# Patient Record
Sex: MALE | Race: BLACK OR AFRICAN AMERICAN | NOT HISPANIC OR LATINO | Employment: STUDENT | URBAN - METROPOLITAN AREA
[De-identification: names, ages, dates, MRNs, and addresses within clinical notes are randomized per-mention and may not be internally consistent; named-entity substitution may affect disease eponyms.]

---
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
## 2023-03-29 ENCOUNTER — HOSPITAL ENCOUNTER (EMERGENCY)
Facility: HOSPITAL | Age: 18
Discharge: HOME | End: 2023-03-29
Attending: EMERGENCY MEDICINE | Admitting: EMERGENCY MEDICINE
Payer: COMMERCIAL

## 2023-03-29 ENCOUNTER — APPOINTMENT (OUTPATIENT)
Dept: GENERAL RADIOLOGY | Facility: HOSPITAL | Age: 18
End: 2023-03-29
Payer: COMMERCIAL

## 2023-03-29 VITALS
SYSTOLIC BLOOD PRESSURE: 149 MMHG | WEIGHT: 160 LBS | RESPIRATION RATE: 16 BRPM | HEIGHT: 69 IN | HEART RATE: 97 BPM | TEMPERATURE: 98.3 F | OXYGEN SATURATION: 96 % | DIASTOLIC BLOOD PRESSURE: 95 MMHG | BODY MASS INDEX: 23.7 KG/M2

## 2023-03-29 DIAGNOSIS — R45.851 DEPRESSION WITH SUICIDAL IDEATION: Primary | ICD-10-CM

## 2023-03-29 DIAGNOSIS — R03.0 ELEVATED BLOOD PRESSURE READING: ICD-10-CM

## 2023-03-29 DIAGNOSIS — F32.A DEPRESSION WITH SUICIDAL IDEATION: Primary | ICD-10-CM

## 2023-03-29 LAB
ALBUMIN SERPL-MCNC: 4.9 G/DL (ref 3.5–5.2)
ALBUMIN/GLOB SERPL: 1.7 G/DL
ALP SERPL-CCNC: 90 U/L (ref 39–117)
ALT SERPL W P-5'-P-CCNC: 17 U/L (ref 1–41)
AMPHET+METHAMPHET UR QL: POSITIVE
ANION GAP SERPL CALCULATED.3IONS-SCNC: 11.7 MMOL/L (ref 5–15)
APAP SERPL-MCNC: <5 MCG/ML (ref 0–30)
AST SERPL-CCNC: 21 U/L (ref 1–40)
BARBITURATES UR QL SCN: NEGATIVE
BASOPHILS # BLD AUTO: 0.03 10*3/MM3 (ref 0–0.2)
BASOPHILS NFR BLD AUTO: 0.3 % (ref 0–1.5)
BENZODIAZ UR QL SCN: NEGATIVE
BILIRUB SERPL-MCNC: 0.3 MG/DL (ref 0–1.2)
BUN SERPL-MCNC: 11 MG/DL (ref 6–20)
BUN/CREAT SERPL: 11.8 (ref 7–25)
CALCIUM SPEC-SCNC: 9.8 MG/DL (ref 8.6–10.5)
CANNABINOIDS SERPL QL: NEGATIVE
CHLORIDE SERPL-SCNC: 101 MMOL/L (ref 98–107)
CO2 SERPL-SCNC: 26.3 MMOL/L (ref 22–29)
COCAINE UR QL: NEGATIVE
CREAT SERPL-MCNC: 0.93 MG/DL (ref 0.76–1.27)
DEPRECATED RDW RBC AUTO: 38.8 FL (ref 37–54)
EGFRCR SERPLBLD CKD-EPI 2021: 121.3 ML/MIN/1.73
EOSINOPHIL # BLD AUTO: 0.08 10*3/MM3 (ref 0–0.4)
EOSINOPHIL NFR BLD AUTO: 0.9 % (ref 0.3–6.2)
ERYTHROCYTE [DISTWIDTH] IN BLOOD BY AUTOMATED COUNT: 13.4 % (ref 12.3–15.4)
ETHANOL BLD-MCNC: <10 MG/DL (ref 0–10)
ETHANOL UR QL: <0.01 %
GLOBULIN UR ELPH-MCNC: 2.9 GM/DL
GLUCOSE SERPL-MCNC: 104 MG/DL (ref 65–99)
HCT VFR BLD AUTO: 48.6 % (ref 37.5–51)
HGB BLD-MCNC: 15.8 G/DL (ref 13–17.7)
IMM GRANULOCYTES # BLD AUTO: 0.03 10*3/MM3 (ref 0–0.05)
IMM GRANULOCYTES NFR BLD AUTO: 0.3 % (ref 0–0.5)
LYMPHOCYTES # BLD AUTO: 1.94 10*3/MM3 (ref 0.7–3.1)
LYMPHOCYTES NFR BLD AUTO: 22.4 % (ref 19.6–45.3)
MCH RBC QN AUTO: 26.6 PG (ref 26.6–33)
MCHC RBC AUTO-ENTMCNC: 32.5 G/DL (ref 31.5–35.7)
MCV RBC AUTO: 81.8 FL (ref 79–97)
METHADONE UR QL SCN: NEGATIVE
MONOCYTES # BLD AUTO: 0.81 10*3/MM3 (ref 0.1–0.9)
MONOCYTES NFR BLD AUTO: 9.3 % (ref 5–12)
NEUTROPHILS NFR BLD AUTO: 5.79 10*3/MM3 (ref 1.7–7)
NEUTROPHILS NFR BLD AUTO: 66.8 % (ref 42.7–76)
NRBC BLD AUTO-RTO: 0 /100 WBC (ref 0–0.2)
OPIATES UR QL: POSITIVE
OXYCODONE UR QL SCN: NEGATIVE
PLATELET # BLD AUTO: 334 10*3/MM3 (ref 140–450)
PMV BLD AUTO: 10.1 FL (ref 6–12)
POTASSIUM SERPL-SCNC: 3.5 MMOL/L (ref 3.5–5.2)
PROT SERPL-MCNC: 7.8 G/DL (ref 6–8.5)
QT INTERVAL: 347 MS
RBC # BLD AUTO: 5.94 10*6/MM3 (ref 4.14–5.8)
SALICYLATES SERPL-MCNC: <0.3 MG/DL
SODIUM SERPL-SCNC: 139 MMOL/L (ref 136–145)
WBC NRBC COR # BLD: 8.68 10*3/MM3 (ref 3.4–10.8)

## 2023-03-29 PROCEDURE — 80053 COMPREHEN METABOLIC PANEL: CPT | Performed by: PHYSICIAN ASSISTANT

## 2023-03-29 PROCEDURE — 80307 DRUG TEST PRSMV CHEM ANLYZR: CPT | Performed by: PHYSICIAN ASSISTANT

## 2023-03-29 PROCEDURE — 80143 DRUG ASSAY ACETAMINOPHEN: CPT | Performed by: PHYSICIAN ASSISTANT

## 2023-03-29 PROCEDURE — 90791 PSYCH DIAGNOSTIC EVALUATION: CPT

## 2023-03-29 PROCEDURE — 82077 ASSAY SPEC XCP UR&BREATH IA: CPT | Performed by: PHYSICIAN ASSISTANT

## 2023-03-29 PROCEDURE — 93010 ELECTROCARDIOGRAM REPORT: CPT | Performed by: INTERNAL MEDICINE

## 2023-03-29 PROCEDURE — 93005 ELECTROCARDIOGRAM TRACING: CPT | Performed by: PHYSICIAN ASSISTANT

## 2023-03-29 PROCEDURE — 85025 COMPLETE CBC W/AUTO DIFF WBC: CPT | Performed by: PHYSICIAN ASSISTANT

## 2023-03-29 PROCEDURE — 99284 EMERGENCY DEPT VISIT MOD MDM: CPT

## 2023-03-29 PROCEDURE — 80179 DRUG ASSAY SALICYLATE: CPT | Performed by: PHYSICIAN ASSISTANT

## 2023-03-29 PROCEDURE — 71045 X-RAY EXAM CHEST 1 VIEW: CPT

## 2023-03-29 RX ORDER — SODIUM CHLORIDE 0.9 % (FLUSH) 0.9 %
10 SYRINGE (ML) INJECTION AS NEEDED
Status: DISCONTINUED | OUTPATIENT
Start: 2023-03-29 | End: 2023-03-29 | Stop reason: HOSPADM

## 2023-03-29 RX ADMIN — SODIUM CHLORIDE 1000 ML: 9 INJECTION, SOLUTION INTRAVENOUS at 17:24

## 2023-03-29 NOTE — ED TRIAGE NOTES
.Pt masked on arrival, staff masked    Pt reports swallowed a tide pod about 40min ago in attempt to commit suicide, did vomit most of it back up, has tried to hurt self in the past; denies SI at this time

## 2023-03-29 NOTE — ED PROVIDER NOTES
EMERGENCY DEPARTMENT ENCOUNTER    Room Number:  07/07  Date of encounter:  3/30/2023  PCP: iMchelle Rabago APRN  Historian: Patient, family  Chronic or social conditions impacting care (social determinants of health): Nothing      I used full protective equipment while examining this patient.  This includes face mask, gloves and protective eyewear.  I washed my hands before entering the room and immediately upon leaving the room      HPI:  Chief Complaint: Ingestion, suicidal ideation  A complete HPI/ROS/PMH/PSH/SH/FH are unobtainable due to: Nothing    Context: Blake Flores is a 17 y.o. male who presents to the ED c/o acute on chronic depression, suicidal ideation and ingestion.  Patient states he was at home today when he swallowed a Tide laundry pod.  He reports he did this because he was lonely.  He has attempted suicide in the past.  Shortly after swallowing this he reports self induced vomiting which she reports he was able to get most of the time applied back up.  He did this approximately 1 hour ago.  He does report burning in his abdomen.  He denies any coingestions, alcohol ingestion.     Review of prior external notes (non-ED):   I reviewed internal medicine office visit from 3/9/2023.  Patient seen for routine physical exam.    Review of prior external test results outside of this encounter:  I reviewed the UDS from [id number].  This showed a positive amphetamine screen.    PAST MEDICAL HISTORY  Active Ambulatory Problems     Diagnosis Date Noted   • No Active Ambulatory Problems     Resolved Ambulatory Problems     Diagnosis Date Noted   • No Resolved Ambulatory Problems     No Additional Past Medical History         PAST SURGICAL HISTORY  No past surgical history on file.      FAMILY HISTORY  No family history on file.      SOCIAL HISTORY  Social History     Socioeconomic History   • Marital status: Single         ALLERGIES  Azithromycin and Cefdinir        REVIEW OF SYSTEMS  All systems  reviewed and negative except for those discussed in HPI.       PHYSICAL EXAM    I have reviewed the triage vital signs and nursing notes.    ED Triage Vitals   Temp Heart Rate Resp BP SpO2   03/29/23 1617 03/29/23 1617 03/29/23 1630 03/29/23 1630 03/29/23 1617   98.3 °F (36.8 °C) (!) 121 16 (!) 163/114 99 %      Temp src Heart Rate Source Patient Position BP Location FiO2 (%)   03/29/23 1617 03/29/23 1617 -- -- --   Tympanic Monitor          Physical Exam  GENERAL: Alert, oriented, not distressed  HENT: head atraumatic, no nuchal rigidity.  Oropharynx patent without evidence of skin breakdown or ulcer.  EYES: no scleral icterus, EOMI  CV: regular rhythm, regular rate, no murmur  RESPIRATORY: normal effort, CTA  ABDOMEN: soft, nontender  MUSCULOSKELETAL: no deformity, FROM, no calf swelling or tenderness  NEURO: alert, moves all extremities, follows commands  PSYCH: Flat affect, poor eye contact  SKIN: warm, dry        LAB RESULTS  Recent Results (from the past 24 hour(s))   Urine Drug Screen - Urine, Clean Catch    Collection Time: 03/29/23  5:13 PM    Specimen: Urine, Clean Catch   Result Value Ref Range    Amphet/Methamphet, Screen Positive (A) Negative    Barbiturates Screen, Urine Negative Negative    Benzodiazepine Screen, Urine Negative Negative    Cocaine Screen, Urine Negative Negative    Opiate Screen Positive (A) Negative    THC, Screen, Urine Negative Negative    Methadone Screen, Urine Negative Negative    Oxycodone Screen, Urine Negative Negative   Comprehensive Metabolic Panel    Collection Time: 03/29/23  5:23 PM    Specimen: Blood   Result Value Ref Range    Glucose 104 (H) 65 - 99 mg/dL    BUN 11 6 - 20 mg/dL    Creatinine 0.93 0.76 - 1.27 mg/dL    Sodium 139 136 - 145 mmol/L    Potassium 3.5 3.5 - 5.2 mmol/L    Chloride 101 98 - 107 mmol/L    CO2 26.3 22.0 - 29.0 mmol/L    Calcium 9.8 8.6 - 10.5 mg/dL    Total Protein 7.8 6.0 - 8.5 g/dL    Albumin 4.9 3.5 - 5.2 g/dL    ALT (SGPT) 17 1 - 41 U/L     AST (SGOT) 21 1 - 40 U/L    Alkaline Phosphatase 90 39 - 117 U/L    Total Bilirubin 0.3 0.0 - 1.2 mg/dL    Globulin 2.9 gm/dL    A/G Ratio 1.7 g/dL    BUN/Creatinine Ratio 11.8 7.0 - 25.0    Anion Gap 11.7 5.0 - 15.0 mmol/L    eGFR 121.3 >60.0 mL/min/1.73   Acetaminophen Level    Collection Time: 03/29/23  5:23 PM    Specimen: Blood   Result Value Ref Range    Acetaminophen <5.0 0.0 - 30.0 mcg/mL   Ethanol    Collection Time: 03/29/23  5:23 PM    Specimen: Blood   Result Value Ref Range    Ethanol <10 0 - 10 mg/dL    Ethanol % <0.010 %   Salicylate Level    Collection Time: 03/29/23  5:23 PM    Specimen: Blood   Result Value Ref Range    Salicylate <0.3 <=30.0 mg/dL   CBC Auto Differential    Collection Time: 03/29/23  5:23 PM    Specimen: Blood   Result Value Ref Range    WBC 8.68 3.40 - 10.80 10*3/mm3    RBC 5.94 (H) 4.14 - 5.80 10*6/mm3    Hemoglobin 15.8 13.0 - 17.7 g/dL    Hematocrit 48.6 37.5 - 51.0 %    MCV 81.8 79.0 - 97.0 fL    MCH 26.6 26.6 - 33.0 pg    MCHC 32.5 31.5 - 35.7 g/dL    RDW 13.4 12.3 - 15.4 %    RDW-SD 38.8 37.0 - 54.0 fl    MPV 10.1 6.0 - 12.0 fL    Platelets 334 140 - 450 10*3/mm3    Neutrophil % 66.8 42.7 - 76.0 %    Lymphocyte % 22.4 19.6 - 45.3 %    Monocyte % 9.3 5.0 - 12.0 %    Eosinophil % 0.9 0.3 - 6.2 %    Basophil % 0.3 0.0 - 1.5 %    Immature Grans % 0.3 0.0 - 0.5 %    Neutrophils, Absolute 5.79 1.70 - 7.00 10*3/mm3    Lymphocytes, Absolute 1.94 0.70 - 3.10 10*3/mm3    Monocytes, Absolute 0.81 0.10 - 0.90 10*3/mm3    Eosinophils, Absolute 0.08 0.00 - 0.40 10*3/mm3    Basophils, Absolute 0.03 0.00 - 0.20 10*3/mm3    Immature Grans, Absolute 0.03 0.00 - 0.05 10*3/mm3    nRBC 0.0 0.0 - 0.2 /100 WBC   ECG 12 Lead Other; OD    Collection Time: 03/29/23  5:32 PM   Result Value Ref Range    QT Interval 347 ms       Ordered the above labs and independently reviewed the results.        RADIOLOGY  XR Chest 1 View    Result Date: 3/29/2023  XR CHEST 1 VW-  Clinical: Vomiting  COMPARISON  09/14/2009  FINDINGS: Cardiac size within normal limits. No mediastinal or hilar abnormality. Lungs clear.  CONCLUSION: No active disease of the chest  This report was finalized on 3/29/2023 5:31 PM by Dr. Ferny Moody M.D.        I ordered the above noted radiological studies. Reviewed by me and discussed with radiologist.  See dictation for official radiology interpretation.      MEDICATIONS GIVEN IN ER    Medications   sodium chloride 0.9 % bolus 1,000 mL (0 mL Intravenous Stopped 3/29/23 2054)         ADDITIONAL ORDERS CONSIDERED BUT NOT ORDERED:  Considered admission, however patient has a good social network.  His family will take him home.      PROGRESS, DATA ANALYSIS, CONSULTS, AND MEDICAL DECISION MAKING    All labs have been independently interpreted by myself.  All radiology studies have been independently interpreted by myself and discussed with radiologist dictating the report.   EKG's independently interpreted by myself.  Discussion below represents my analysis of pertinent findings related to patient's condition, differential diagnosis, treatment plan and final disposition.    I have discussed case with Dr. Saleem, emergency room physician.  He has performed his own bedside examination and agrees with treatment plan.    ED Course as of 03/30/23 0035   Wed Mar 29, 2023   1654 Patient presents from home after intentionally swallowing a Tide pod just prior to arrival in efforts to hurt himself.  He reports vomiting most of it back up.  He does complain of burning in his abdomen.  Patient is tachycardic however otherwise nontoxic-appearing. [EE]   1655 I spoke with Rm, at [facility].  He explained this is mostly a supportive treatment plan.  No specific treatment is necessary. [EE]   1739 EKG independently interpreted by myself.  Time 5:32 PM.  Sinus rhythm.  Heart rate 96.  Right axis deviation.  Early repolarization pattern. [TD]   1740 Chest x-ray independently interpreted myself shows no  evidence of aspiration pneumonia. [EE]   1740 Patient denies having any chest pain or shortness of breath. [TD]   1742 WBC: 8.68 [EE]   1742 Hemoglobin: 15.8 [EE]   1813 Ethanol: <10 [EE]   1813 Acetaminophen: <5.0 [EE]   1817 Amphet/Methamphet, Screen(!): Positive [EE]   1817 Opiate Screen(!): Positive [EE]   1817 Recheck patient.  He is resting quietly. [EE]   1925 [facility] in with patient at this time. [EE]   2055 Anabell from [facility] has seen and evaluated the patient.  The patient states he had no true suicidal intent.  He has never actually attempted suicide in the past.  They believe he is safe for IOP.  He do not believe he needs admission.  They will do IOP tomorrow.  The patient will go home with his family who will watch him. [EE]   2111 Patient's blood pressure has been elevated here.  He has no prior history of this.  He does have appropriate primary care follow-up available.  They will monitor this at home.  He denies any chest pain, shortness of breath, headache, pedal edema. [EE]      ED Course User Index  [EE] Chauncey Mathew PA  [TD] Julio C Saleem II, MD       AS OF 00:35 EDT VITALS:    BP - 149/95  HR - 97  TEMP - 98.3 °F (36.8 °C) (Tympanic)  O2 SATS - 96%        DIAGNOSIS  Final diagnoses:   Depression with suicidal ideation   Elevated blood pressure reading         DISPOSITION  Discharged      Dictated utilizing Dragon dictation     Chauncey Mathew PA  03/30/23 0035

## 2023-03-29 NOTE — ED NOTES
PC called spoke with Azul Andrea; recommendations to check mouth for burns, then may begin with clear liquid diet and advance as tolerated, complete normal lab work for poisoning, and may begin psychiatric consult. PC said they would call back later today.

## 2023-03-30 ENCOUNTER — OFFICE VISIT (OUTPATIENT)
Dept: PSYCHIATRY | Facility: HOSPITAL | Age: 18
End: 2023-03-30
Payer: COMMERCIAL

## 2023-03-30 DIAGNOSIS — F33.1 MAJOR DEPRESSIVE DISORDER, RECURRENT EPISODE, MODERATE: Primary | ICD-10-CM

## 2023-03-30 PROCEDURE — S9480 INTENSIVE OUTPATIENT PSYCHIA: HCPCS | Performed by: SOCIAL WORKER

## 2023-03-30 NOTE — NURSING NOTE
"[facility] center consult for 17 year old male seen in ED bed 7 for ingestion and depression. Pt lives at home with his parents (Daiana and Pablito) who were both present during the interview at the patient's request. Pt is not currently working and is attending school full time at [employer]. Pt has no siblings. Pt has a girlfriend of 6 months and he states they have a positive relationship. Pt's UDS was positive for amphetamines and opiates. BAL was negative. Pt has been seen once before by the [facility] in December of 2021 where he was transferred to [facility] for suicidal thoughts.     Pt calm and cooperative with assessment, A&Ox4. Parents remain in the room with the pt's permission. Pt initially very vague in answering questions, very flat with poor eye contact. However, as we continued to talk the pt did open up and become more animated, smiling and joking at times. Pt states that he is a freshman at [employer] and that it was \"a normal day at school\" and states he took the bus home, and while his parents were not home, he impulsively decided to swallow a Tide laundry detergent pod. Pt states he \"looked up what to expect\" prior to taking the tablet. Pt states the tablet burned and immediately after taking it, he made himself vomit the tablet. Immediately after vomiting he called his parents and told them what he had done, and he also called his girlfriend. Pt's parents brought him to the ED after arriving home. Pt stated that the act was impulsive, but that he did not know of any potential triggers. After speaking more into the interview, the pt revealed that he is under a lot of pressure at school with his midterms coming up. Pt is attending school on a scholarship, and states if he falls below a certain GPA he will be out of the program. Pt states he \"doesn't want to be homeless\" (future orientation). Pt states he also does not want to disappoint his parents which has lead to even greater pressure. Pt stated " "that he has a midterm paper that \"I have to get done when I get home\" (future orientation). Asked patient if he wished to be dead to which he stated no, and he denied having any suicidal thoughts at the time of assessment. Pt states he has felt depressed for as long as he can remember, and that in the last couple of years his anxiety has increased, particularly social anxiety. Pt does have a current therapist and psychiatrist NP both whom he sees at [facility]. Pt states he has been seeing HARIS Erickson for approximately one year, as well as Mejia Man LCSW for about a year. Pt states he had medication gene testing done 4-6 months ago, and following those results he was started on Pristiq 100mg daily. Pt states he has felt improvement in his symptoms of depression stating that he feels more hopeful and motivated than he did prior, but that he continues to have high anxiety and impulsive/intrusive thoughts. Pt also states that he has poor sleep getting roughly 4 hours a night. Pt states he has never had a suicide attempt, but that he does have a hx of self harming without intent to die via cutting. Pt states he has not cut since January 2023, but prior to that he had not cut for 9 months. Pt denies any drug or alcohol use. Attempted to ask why his UDS was positive for opiates, to which the patient and parents both had no explanation.     Pt denies the wish to be dead, denies that he is having any suicidal thoughts. Pt states he does feel safe to return home and he is very concerned about completing his assignments for school. Discussed treatment options with patient and parents. Pt is happy with current therapist and NP, but understands that more is needed at this time to address his impulsive behaviors. Pt and parents both agreeable to attend the [facility] program starting tomorrow, 3/30/23. Spoke with parents individually and with pt present who agree that they safe with bringing the " patient home tonight and driving him to IOP in the morning. Parents state that they have safe guarded the home to the best of their abilities with any potential weapons being locked in a safe, as well as the pt's mother administering the pt's medications.     Spoke with LIDIA Mathew who agrees with plan to d/c with IOP plan in place. Pt and parents given resource information and all questions at the time of assessment were answered. Educated that if any behaviors progressed to return to the ED or call 911.

## 2023-03-30 NOTE — PROGRESS NOTES
Other     Information/activity     [id number] Self Forgiveness - Watched the video Why Is It So Hard To Forgive Yourself? and read the handout Forgiveness Methods to initiate discussion about self forgiveness    Instructor JACLYN Quiñones     13:40 EDT     Patient Response Good participation  Pt was attentive to the video and the discussion. Pt shared own understanding of the material and encouraged peers.

## 2023-03-30 NOTE — PROGRESS NOTES
Mood at 2 with 10 being the worse and found hearing other people's perspectives on what they've been through to be most helpful today.     Easily distracted     No SI and appropriate goals for the day.

## 2023-03-30 NOTE — ED PROVIDER NOTES
MD ATTESTATION NOTE    The LUIS CARLOS and I have discussed this patient's history, physical exam, and treatment plan.    I provided a substantive portion of the care of this patient. I personally performed the physical exam, in its entirety. The attached note describes my personal findings.      Blake Flores is a 17 y.o. male who presents to the ED c/o ingestion of a Tide pod.  He states this was intentional as he wanted to harm self.  He has tried cutting in the past.  He denies having any chest pain, shortness of breath.      On exam:  GENERAL: not distressed  HENT: nares patent  EYES: no scleral icterus  CV: regular rhythm, regular rate  RESPIRATORY: normal effort, clear to auscultation bilaterally  ABDOMEN: soft  MUSCULOSKELETAL: no deformity  NEURO: alert, moves all extremities, follows commands  SKIN: warm, dry  PSYCH: Flat affect    Labs  Recent Results (from the past 24 hour(s))   Urine Drug Screen - Urine, Clean Catch    Collection Time: 03/29/23  5:13 PM    Specimen: Urine, Clean Catch   Result Value Ref Range    Amphet/Methamphet, Screen Positive (A) Negative    Barbiturates Screen, Urine Negative Negative    Benzodiazepine Screen, Urine Negative Negative    Cocaine Screen, Urine Negative Negative    Opiate Screen Positive (A) Negative    THC, Screen, Urine Negative Negative    Methadone Screen, Urine Negative Negative    Oxycodone Screen, Urine Negative Negative   Comprehensive Metabolic Panel    Collection Time: 03/29/23  5:23 PM    Specimen: Blood   Result Value Ref Range    Glucose 104 (H) 65 - 99 mg/dL    BUN 11 6 - 20 mg/dL    Creatinine 0.93 0.76 - 1.27 mg/dL    Sodium 139 136 - 145 mmol/L    Potassium 3.5 3.5 - 5.2 mmol/L    Chloride 101 98 - 107 mmol/L    CO2 26.3 22.0 - 29.0 mmol/L    Calcium 9.8 8.6 - 10.5 mg/dL    Total Protein 7.8 6.0 - 8.5 g/dL    Albumin 4.9 3.5 - 5.2 g/dL    ALT (SGPT) 17 1 - 41 U/L    AST (SGOT) 21 1 - 40 U/L    Alkaline Phosphatase 90 39 - 117 U/L    Total Bilirubin 0.3 0.0 -  1.2 mg/dL    Globulin 2.9 gm/dL    A/G Ratio 1.7 g/dL    BUN/Creatinine Ratio 11.8 7.0 - 25.0    Anion Gap 11.7 5.0 - 15.0 mmol/L    eGFR 121.3 >60.0 mL/min/1.73   Acetaminophen Level    Collection Time: 03/29/23  5:23 PM    Specimen: Blood   Result Value Ref Range    Acetaminophen <5.0 0.0 - 30.0 mcg/mL   Ethanol    Collection Time: 03/29/23  5:23 PM    Specimen: Blood   Result Value Ref Range    Ethanol <10 0 - 10 mg/dL    Ethanol % <0.010 %   Salicylate Level    Collection Time: 03/29/23  5:23 PM    Specimen: Blood   Result Value Ref Range    Salicylate <0.3 <=30.0 mg/dL   CBC Auto Differential    Collection Time: 03/29/23  5:23 PM    Specimen: Blood   Result Value Ref Range    WBC 8.68 3.40 - 10.80 10*3/mm3    RBC 5.94 (H) 4.14 - 5.80 10*6/mm3    Hemoglobin 15.8 13.0 - 17.7 g/dL    Hematocrit 48.6 37.5 - 51.0 %    MCV 81.8 79.0 - 97.0 fL    MCH 26.6 26.6 - 33.0 pg    MCHC 32.5 31.5 - 35.7 g/dL    RDW 13.4 12.3 - 15.4 %    RDW-SD 38.8 37.0 - 54.0 fl    MPV 10.1 6.0 - 12.0 fL    Platelets 334 140 - 450 10*3/mm3    Neutrophil % 66.8 42.7 - 76.0 %    Lymphocyte % 22.4 19.6 - 45.3 %    Monocyte % 9.3 5.0 - 12.0 %    Eosinophil % 0.9 0.3 - 6.2 %    Basophil % 0.3 0.0 - 1.5 %    Immature Grans % 0.3 0.0 - 0.5 %    Neutrophils, Absolute 5.79 1.70 - 7.00 10*3/mm3    Lymphocytes, Absolute 1.94 0.70 - 3.10 10*3/mm3    Monocytes, Absolute 0.81 0.10 - 0.90 10*3/mm3    Eosinophils, Absolute 0.08 0.00 - 0.40 10*3/mm3    Basophils, Absolute 0.03 0.00 - 0.20 10*3/mm3    Immature Grans, Absolute 0.03 0.00 - 0.05 10*3/mm3    nRBC 0.0 0.0 - 0.2 /100 WBC   ECG 12 Lead Other; OD    Collection Time: 03/29/23  5:32 PM   Result Value Ref Range    QT Interval 347 ms       Radiology  XR Chest 1 View    Result Date: 3/29/2023  XR CHEST 1 VW-  Clinical: Vomiting  COMPARISON 09/14/2009  FINDINGS: Cardiac size within normal limits. No mediastinal or hilar abnormality. Lungs clear.  CONCLUSION: No active disease of the chest  This report was  finalized on 3/29/2023 5:31 PM by Dr. Ferny Moody M.D.        Medications given in the ED:  Medications   sodium chloride 0.9 % flush 10 mL (has no administration in time range)   sodium chloride 0.9 % bolus 1,000 mL (0 mL Intravenous Stopped 3/29/23 2054)       Orders placed during this visit:  Orders Placed This Encounter   Procedures   • XR Chest 1 View   • Comprehensive Metabolic Panel   • Acetaminophen Level   • Ethanol   • Urine Drug Screen - Urine, Clean Catch   • Salicylate Level   • CBC Auto Differential   • [facility] Consult   • ECG 12 Lead Other; OD   • Insert Peripheral IV   • Suicide precautions   • CBC & Differential       Medical Decision Making:  ED Course as of 03/29/23 2312   Wed Mar 29, 2023   1654 Patient presents from home after intentionally swallowing a Tide pod just prior to arrival in efforts to hurt himself.  He reports vomiting most of it back up.  He does complain of burning in his abdomen.  Patient is tachycardic however otherwise nontoxic-appearing. [EE]   1655 I spoke with Rm at [facility].  He explained this is mostly a supportive treatment plan.  No specific treatment is necessary. [EE]   1739 EKG independently interpreted by myself.  Time 5:32 PM.  Sinus rhythm.  Heart rate 96.  Right axis deviation.  Early repolarization pattern. [TD]   1740 Chest x-ray independently interpreted myself shows no evidence of aspiration pneumonia. [EE]   1740 Patient denies having any chest pain or shortness of breath. [TD]   1742 WBC: 8.68 [EE]   1742 Hemoglobin: 15.8 [EE]   1813 Ethanol: <10 [EE]   1813 Acetaminophen: <5.0 [EE]   1817 Amphet/Methamphet, Screen(!): Positive [EE]   1817 Opiate Screen(!): Positive [EE]   1817 Recheck patient.  He is resting quietly. [EE]   1925 [facility] in with patient at this time. [EE]   2055 Anabell from [facility] has seen and evaluated the patient.  The patient states he had no true suicidal intent.  He has never actually attempted suicide in the  past.  They believe he is safe for IOP.  He do not believe he needs admission.  They will do IOP tomorrow.  The patient will go home with his family who will watch him. [EE]   2111 Patient's blood pressure has been elevated here.  He has no prior history of this.  He does have appropriate primary care follow-up available.  They will monitor this at home.  He denies any chest pain, shortness of breath, headache, pedal edema. [EE]      ED Course User Index  [EE] Chauncey Mathew PA  [TD] Julio C Saleem II, MD           PPE: Both the patient and I wore a surgical mask throughout the entire patient encounter.     Diagnosis  Final diagnoses:   Depression with suicidal ideation   Elevated blood pressure reading        Julio C Saleem II, MD  03/29/23 9097

## 2023-03-30 NOTE — PROGRESS NOTES
Psych IOP  Group note  Observations:    Engaged in Activity / Process and Self -disclosed: Yes  Applies Topic to self: Yes  Able to give Constructive Feedback: No   Affect: appropriate to situation   Degree of Insightful Thinking 5  Notes:  Pt new to group today. Pt quiet at times but attentive to peers. Pt related to peer's experience of trying to start college after high school. Pt also connected with peer on shared interest in comic book series. Pt did not request time for self today but will be given opportunity to share more in group tomorrow.       DEANNA SpearW

## 2023-03-31 ENCOUNTER — OFFICE VISIT (OUTPATIENT)
Dept: PSYCHIATRY | Facility: HOSPITAL | Age: 18
End: 2023-03-31
Payer: COMMERCIAL

## 2023-03-31 DIAGNOSIS — F33.1 MAJOR DEPRESSIVE DISORDER, RECURRENT EPISODE, MODERATE: Primary | ICD-10-CM

## 2023-03-31 PROCEDURE — S9480 INTENSIVE OUTPATIENT PSYCHIA: HCPCS | Performed by: SOCIAL WORKER

## 2023-03-31 NOTE — PROGRESS NOTES
Psych IOP  Group note  Observations:    Engaged in Activity / Process and Self -disclosed: Yes  Applies Topic to self: Yes  Able to give Constructive Feedback: No  Affect: quiet; appropriate to situation; consistent with mood   Degree of Insightful Thinking 5  Notes:  Pt mostly listened quietly as peers shared in group. Pt requested paper to doodle on but appeared to be attending to others. When prompted, pt initially reported not having much to share but was able to open up when asked questions. Pt shared of support from girlfriend and positive relationship with parents. Pt reported being unsure of life direction but having goal to have a job and get a cat. Pt is currently in school and noted some difficulty connecting to others, though this is where he met his girlfriend. Pt also shared of interest in punk and folk music and playing guitar. Pt reported that in the two days he has been in IOP, already feeling like he is getting something from the group. Pt receptive to support he received from group members.       Polina Ndiaye LCSW

## 2023-03-31 NOTE — PROGRESS NOTES
Mood at 3 with 10 being the worse and found hearing everyone else's experiences to be most helpful today.     Easily distracted     No SI and appropriate goals for the weekend. Pt will return on Monday.

## 2023-03-31 NOTE — PROGRESS NOTES
"Other     Information/activity     9271-7919 Art Therapy - Used magazines to create a two part collage showing description of \"What led me to being in IOP\" and \"What I want in the future\"; shared own collage with the group and processed the experience.    Instructor JACLYN Quiñones     11:22 EDT     Patient Response Good participation  Pt focused on the task and shared interests in art and music. 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 Pt was open about past suicide attempt and not knowing what led up the it. Pt accepted peer feedback.        "

## 2023-04-03 ENCOUNTER — OFFICE VISIT (OUTPATIENT)
Dept: PSYCHIATRY | Facility: HOSPITAL | Age: 18
End: 2023-04-03
Payer: COMMERCIAL

## 2023-04-03 DIAGNOSIS — F33.1 MAJOR DEPRESSIVE DISORDER, RECURRENT EPISODE, MODERATE: Primary | ICD-10-CM

## 2023-04-03 PROCEDURE — S9480 INTENSIVE OUTPATIENT PSYCHIA: HCPCS | Performed by: COUNSELOR

## 2023-04-03 NOTE — PROGRESS NOTES
Other     Information/activity     0210-1832 Empathy, Blame, Mindfulness, and Self-Compassion - Watched videos by Wilber Delarosa on Empathy and Blame; Watched the video The Power of Mindfulness and read the handout Self-Compassion and discussed these with the group.    Instructor JACLYN Quiñones     11:18 EDT     Patient Response Good participation  Pt focused on the topic and related to peers.Shared insights.

## 2023-04-03 NOTE — PROGRESS NOTES
Mood at 3 with 10 being the worse and shared the class was most helpful today.    Symptoms include:    Easily distracted     Denied SI.  Shared appropriate goals. Plans to return to [facility] Tuesday, April 4.

## 2023-04-04 ENCOUNTER — OFFICE VISIT (OUTPATIENT)
Dept: PSYCHIATRY | Facility: HOSPITAL | Age: 18
End: 2023-04-04
Payer: COMMERCIAL

## 2023-04-04 DIAGNOSIS — F33.1 MAJOR DEPRESSIVE DISORDER, RECURRENT EPISODE, MODERATE: Primary | ICD-10-CM

## 2023-04-04 PROCEDURE — S9480 INTENSIVE OUTPATIENT PSYCHIA: HCPCS | Performed by: COUNSELOR

## 2023-04-04 NOTE — PROGRESS NOTES
Mood at 3 with 10 being the worse and found learning new self-help skills to be most helpful today.     Easily distracted     No SI and appropriate goals.

## 2023-04-04 NOTE — PROGRESS NOTES
Psych IOP  Group note  Observations:    Engaged in Activity / Process and Self -disclose--somewhat   Applies Topic to self: no  Able to give Constructive Feedback: No  Affect: flat  Degree of Insightful Thinking 5  Notes:  Pt stated he did not have anything to share today.  When asked direct questions he was able to say he was here because his parents want him here.         Kendra Angeles, [facility]

## 2023-04-04 NOTE — PROGRESS NOTES
[id number] Psych IOP Class:    Class topic: self-forgiveness; dealing with regret     Class participation: pt able to engage and share thoughts on topic; pt reported being in a place of self-hate and unable to relate to topic at this time.

## 2023-04-04 NOTE — PROGRESS NOTES
Psych IOP  Group note  Observations:    Engaged in Activity / Process and Self -disclosed: Yes  Applies Topic to self: Yes  Able to give Constructive Feedback: No  Affect: flat  Degree of Insightful Thinking 5  Notes:  Pt was rather quiet until asked direct questions.  He shared of previous interest with [employer] but not recently, of love of playing the guitar and sharing that interest with his father and of enjoying time with his girlfriend of 6 months.  This weekend his g.f. learned of a friend of her's dying of an OD and pt spent time being supportive of her.   He remained quiet but appeared to be attentive as he doodled while others shared.      Kendra Angeles, [facility]

## 2023-04-05 ENCOUNTER — OFFICE VISIT (OUTPATIENT)
Dept: PSYCHIATRY | Facility: HOSPITAL | Age: 18
End: 2023-04-05
Payer: COMMERCIAL

## 2023-04-05 DIAGNOSIS — F33.1 MAJOR DEPRESSIVE DISORDER, RECURRENT EPISODE, MODERATE: Primary | ICD-10-CM

## 2023-04-05 PROCEDURE — S9480 INTENSIVE OUTPATIENT PSYCHIA: HCPCS | Performed by: COUNSELOR

## 2023-04-05 NOTE — PROGRESS NOTES
Other     Information/activity     6826-9557 Art Therapy - Had the therapeutic story [place] read aloud, then used watercolor on paper to create an image of the story that related to or that stood out, then processed the experience.    Instructor Christopher Quiles, LPAT     14:24 EDT     Patient Response Good participation  Pt had poor eye contact, was not invested in the art task and shared minimally of the therapeutic story related to the created image. Pt was later able to engage in a conversation about other topics and did not shared about emotions.

## 2023-04-05 NOTE — PROGRESS NOTES
Psych IOP  Group note  Observations:    Engaged in Activity / Process and Self -disclosed: Yes  Applies Topic to self: Somewhat, with much encouragement  Able to give Constructive Feedback: No  Affect: flat  Degree of Insightful Thinking 4  Notes:  Pt quiet and doodling during group.  At one point, he did interject with an appropriate comment to a peer.  He does relate to group members individually when they talk of a topic of his interest.      Kendra Angeles, [facility]

## 2023-04-05 NOTE — TREATMENT PLAN
Multi-Disciplinary Problems (from Behavioral Health Treatment Plan)    Active Problems     Problem: Depression  Start Date: 04/05/23    Problem Details: The patient self-scales this problem as a 8 with 10 being the worst.        Goal Priority Start Date Expected End Date End Date    Patient will demonstrate the ability to initiate new constructive life skills outside of sessions on a consistent basis. -- 04/05/23 -- --    Goal Details: Progress toward goal:  Not appropriate to rate progress toward goal since this is the initial treatment plan.        Goal Intervention Frequency Start Date End Date    Assist patient in setting attainable activities of daily living goals. PRN 04/05/23 --    Goal Intervention Frequency Start Date End Date    Provide education about depression Weekly 04/05/23 --    Intervention Details: Duration of treatment until until discharged.        Goal Intervention Frequency Start Date End Date    Assist patient in developing healthy coping strategies. Weekly 04/05/23 --    Intervention Details: Duration of treatment until until discharged.                    Reviewed By     Polina Ndiaye LCSW 04/05/23 1002                 I have discussed and reviewed this treatment plan with the patient.  It has been printed for signatures.

## 2023-04-05 NOTE — PROGRESS NOTES
"Mood at 3 with 10 being the worse and reported \"nothing really helped me today\".     Easily distracted     No SI and goal for the day reported to be to talk to this . Pt encouraged to do something for self-care or coping. Pt reported he may play the guitar some today but does that most days anyway. Therapist explored what treatment team could do to further support patient or address treatment needs. Pt unable to identify anything at this point but was encouraged to think about it.   "

## 2023-04-06 ENCOUNTER — OFFICE VISIT (OUTPATIENT)
Dept: PSYCHIATRY | Facility: HOSPITAL | Age: 18
End: 2023-04-06
Payer: COMMERCIAL

## 2023-04-06 DIAGNOSIS — F33.1 MAJOR DEPRESSIVE DISORDER, RECURRENT EPISODE, MODERATE: Primary | ICD-10-CM

## 2023-04-06 PROCEDURE — S9480 INTENSIVE OUTPATIENT PSYCHIA: HCPCS | Performed by: SOCIAL WORKER

## 2023-04-06 NOTE — PROGRESS NOTES
Mood at 2 with 10 being the worse and shared listening to peers was most helpful today.    Symptoms include:    Easily distracted      Denied SI.  Shared appropriate goals. Plans to return to [facility] Friday, April 7.

## 2023-04-06 NOTE — PROGRESS NOTES
Psych IOP  Group note  Observations:    Engaged in Activity / Process and Self -disclosed: Yes  Applies Topic to self: Yes  Able to give Constructive Feedback: Yes  Affect: appropriate to situation   Degree of Insightful Thinking 6  Notes:  Pt attentive to peers who shared in group, relating to peers' experiences in ways associated with his family and girlfriend. Pt did not request time for self today but therapist inquired if he needed anything prior to close of group. Pt reported doing okay today and not having anything to share. Therapist will offer time to pt at start of group tomorrow.       Polina Ndiaye LCSW

## 2023-04-06 NOTE — PROGRESS NOTES
Progress note:  Met with patient individually to discuss progress in treatment and treatment plan.  Although he has stated that he is only here because his parents want him here, he is trying his best to participate in group and benefit from the experience.  He agrees that this is a good way to deal with his social anxiety.  Patient states he is most troubled by his ADHD for which he takes Vyvanse-no doubt the reason he tested positive for amphetamines.  He struggles with constant distraction and finds that drawing in group helps him to focus. At home, playing his guitar is most helpful.   He has told group members that even though he is drawing, he is still listening to them.  Very thoughtful.  Patient feels supported by his parents and his girlfriend.  Patient has learned things in program that he has shared with his girlfriend who has similar problems.  Encouraged him to let us know if there is anything that we can do to assist him.  Patient was open and engaged during our conversation.

## 2023-04-06 NOTE — PROGRESS NOTES
Other     Information/activity     4797-0871 Discussed survival response of the brain, shared of deep breathing, gave handout Deep Breathing Exercise and listened to the guided imagery from Focusing On Feeling Good    Instructor Christopher Quiles, LPAT     12:29 EDT     Patient Response Good participation  Pt arrived late and joined the discussion and related to peers. Pt stated to guided imagery was helpful after able to relax.

## 2023-04-07 ENCOUNTER — OFFICE VISIT (OUTPATIENT)
Dept: PSYCHIATRY | Facility: HOSPITAL | Age: 18
End: 2023-04-07
Payer: COMMERCIAL

## 2023-04-07 DIAGNOSIS — F33.1 MAJOR DEPRESSIVE DISORDER, RECURRENT EPISODE, MODERATE: Primary | ICD-10-CM

## 2023-04-07 PROCEDURE — S9480 INTENSIVE OUTPATIENT PSYCHIA: HCPCS | Performed by: SOCIAL WORKER

## 2023-04-07 NOTE — PROGRESS NOTES
"Other     Information/activity     3524-0421 Art Therapy - \"I Statement Interview\" - the group paired and interviewed each other for 3 minutes each, the the members created a visual description of three qualities of the other person. Used colored paper glued to another sheet of paper to symbolize the qualities. Presented the images and then had the other person receive the symbols and repeat the qualities using \"I\" statements.Processed as a group.    Instructor Christopher Quiles [facility]     10:50 EDT     Patient Response Good participation  Pt focused on the interview process and creating the symbols. Pt also was active in the group processing.        "

## 2023-04-07 NOTE — PROGRESS NOTES
Mood at 2 with 10 being the worse and shared hearing others share was most helpful today.    Symptoms include:    Easily distracted     Denied SI.  Shared appropriate goals. Plans to return to [facility] Monday, April 10.

## 2023-04-07 NOTE — PROGRESS NOTES
Psych IOP  Group note  Observations:    Engaged in Activity / Process and Self -disclosed: Yes  Applies Topic to self: Yes  Able to give Constructive Feedback: No   Affect: appropriate to situation   Degree of Insightful Thinking 6  Notes:  Pt shared of feeling pretty good as he had made time to play guitar this morning before IOP. Pt processed what he likes about playing and noted channeling anger or other feelings into his playing sometimes. Therapist as well as peers explored pt's report of anger and pt able to process it being more towards self; related to negative self-talk, self-hate, and expectations he has of himself. Pt shared sometimes feeling like he is an okay person but then other times struggling with self and worth. Pt reported ability to accept love from his girlfriend despite not having it for self. Therapist explored this further and offered feedback about why getting to a place of self-love/compassion would be important. Therapist highlighted the work pt was already doing and also explored feelings that could be underneath anger. Pt receptive to feedback as well as support and feedback from peers. Pt mostly listened quietly for rest of group but was attentive.       Polina Ndiaye LCSW

## 2023-04-10 ENCOUNTER — OFFICE VISIT (OUTPATIENT)
Dept: PSYCHIATRY | Facility: HOSPITAL | Age: 18
End: 2023-04-10
Payer: COMMERCIAL

## 2023-04-10 DIAGNOSIS — F33.1 MAJOR DEPRESSIVE DISORDER, RECURRENT EPISODE, MODERATE: Primary | ICD-10-CM

## 2023-04-10 PROCEDURE — S9480 INTENSIVE OUTPATIENT PSYCHIA: HCPCS | Performed by: COUNSELOR

## 2023-04-10 NOTE — PROGRESS NOTES
Psych IOP  Group note  Observations:    Engaged in Activity / Process and Self -disclosed: Yes  Applies Topic to self: Yes  Able to give Constructive Feedback: Yes  Affect: engaged  Degree of Insightful Thinking 5  Notes:  Pt shared of positives with girlfriend and his family this weekend.  He is very focused on completing schoolwork today and his semester will be ending in a few weeks.  He was attentive, engaged and related to peers and offered good suggestions.      Kendra Angeles, [facility]

## 2023-04-10 NOTE — PROGRESS NOTES
Mood at 2  with 10 being the worse and found the video on vulnerability to be most helpful today    Easily distracted     No SI    Has goals to focus on his schoolwork and to return to IOP tomorrow.

## 2023-04-10 NOTE — PROGRESS NOTES
Other     Information/activity     6176-8154 Watched the video The Power of Vulnerability by Wilber Delarosa and discussed shame, vulnerability, and having a sense of worthiness    Instructor Christopher Quiles, [facility]     10:42 EDT     Patient Response Good participation  Pt focused on the video, shared insights into the topics and shared own life examples.

## 2023-04-11 ENCOUNTER — OFFICE VISIT (OUTPATIENT)
Dept: PSYCHIATRY | Facility: HOSPITAL | Age: 18
End: 2023-04-11
Payer: COMMERCIAL

## 2023-04-11 DIAGNOSIS — F33.1 MAJOR DEPRESSIVE DISORDER, RECURRENT EPISODE, MODERATE: Primary | ICD-10-CM

## 2023-04-11 PROCEDURE — S9480 INTENSIVE OUTPATIENT PSYCHIA: HCPCS | Performed by: COUNSELOR

## 2023-04-11 NOTE — PROGRESS NOTES
[id number] Psych IOP Class     Class topic: boundaries     Class participation: good; pt actively engaged and shared some personal experiences.

## 2023-04-11 NOTE — PROGRESS NOTES
Mood at 3 with 10 being the worse and found the class on boundaries to be most helpful    Easily distracted     No SI    Has appropriate goals and to return to IOP tomorrow.

## 2023-04-11 NOTE — PROGRESS NOTES
Psych IOP  Group note  Observations:    Engaged in Activity / Process and Self -disclosed: Yes  Applies Topic to self: Yes  Able to give Constructive Feedback: Yes  Affect: engaged  Degree of Insightful Thinking 6  Notes:  Pt shared of spending numerous hours on school work yesterday.  Reported he felt relieved and later rewarded himself.  He was attentive and offered support and suggestions to several peers.      Kendra Angeles, [facility]

## 2023-04-12 ENCOUNTER — OFFICE VISIT (OUTPATIENT)
Dept: PSYCHIATRY | Facility: HOSPITAL | Age: 18
End: 2023-04-12
Payer: COMMERCIAL

## 2023-04-12 DIAGNOSIS — F33.1 MAJOR DEPRESSIVE DISORDER, RECURRENT EPISODE, MODERATE: Primary | ICD-10-CM

## 2023-04-12 PROCEDURE — S9480 INTENSIVE OUTPATIENT PSYCHIA: HCPCS | Performed by: COUNSELOR

## 2023-04-12 NOTE — PLAN OF CARE
Pt has attended 7 IOP days dealing with depression and ADD.   He is reporting an improved mood  and a decrease in depressive symptoms while continuing to report feeling easily distracted.  No SI.  He has been more engaged this week offering of his own experiences while giving support and feedback to others.  Pt has been able to remain focused on completing his college class work as the end of the semester is approaching soon.  He enjoys playing the guitar and spending time with his girlfriend and his family when not focused on school.  Pt has OP providers in place.  The d/c date is yet to be determined.    Patient/Guardian Signature: __________________________________            Psychiatrist Signature: ______________________________________             Therapist Signature: ________________________________________         Nurse Signature: ___________________________________________          Staff Signature: ____________________________________________            Staff Signature: ____________________________________________          Staff Signature: ____________________________________________          Staff Signature:

## 2023-04-12 NOTE — PROGRESS NOTES
Other     Information/activity     3314-0868 Art Therapy - Used magazines the create a collage of Gratitude; then processed with the group.    Instructor Christopher Quiles, LPAT     12:21 EDT     Patient Response Good participation  Pt focused on the task and shared gratitude related to parents. Needed encouragement to interact with peers.

## 2023-04-12 NOTE — PROGRESS NOTES
Psych IOP  Group note  Observations:    Engaged in Activity / Process and Self -disclosed: Yes, briefly  Applies Topic to self: Yes  Able to give Constructive Feedback: No  Affect: flat  Degree of Insightful Thinking 6  Notes:  Pt somewhat quieter today and when asked direct questions, reported he spent much time on schoolwork yesterday and played the guitar for enjoyment.  Has plans to do the same later today.  Remained mostly quiet as others shared.      Kendra Angeles, [facility]

## 2023-04-12 NOTE — PROGRESS NOTES
"Mood at 3  with 10 being the worse and found \"hearing everyone speak\" as most helpful today    Easily distracted     No SI    Has appropriate goals and to return tomorrow.  "

## 2023-04-13 ENCOUNTER — OFFICE VISIT (OUTPATIENT)
Dept: PSYCHIATRY | Facility: HOSPITAL | Age: 18
End: 2023-04-13
Payer: COMMERCIAL

## 2023-04-13 DIAGNOSIS — F33.1 MAJOR DEPRESSIVE DISORDER, RECURRENT EPISODE, MODERATE: Primary | ICD-10-CM

## 2023-04-13 PROCEDURE — S9480 INTENSIVE OUTPATIENT PSYCHIA: HCPCS | Performed by: SOCIAL WORKER

## 2023-04-13 NOTE — PROGRESS NOTES
" Psych IOP  Group note  Observations:    Engaged in Activity / Process and Self -disclosed: somewhat   Applies Topic to self: No  Able to give Constructive Feedback: Yes  Affect: quiet  Degree of Insightful Thinking 5  Notes:  Pt appeared more engaged at the beginning of group as he showed support for a peer and shared his thoughts on group interaction the previous day. Pt mostly listened quietly for the rest of group as he ayla on paper. Pt will be invited to share more about self tomorrow. Therapist will also check in with pt regarding statement of \"not getting anything out of this\" that was made prior to start of group.       Polina Ndiaye, DEANNAW  "

## 2023-04-13 NOTE — PROGRESS NOTES
Other     Information/activity     8890-9123 Shame - Watched the video Listening to Shame by Wilber Delarosa and discussed the topics of shame, vulnerability and empathy    Instructor JACLYN Quiñones     11:23 EDT     Patient Response Good participation  Pt focused on the videos and the discussion of the topics. Pt shared of own struggles with vulnerability.

## 2023-04-13 NOTE — PROGRESS NOTES
Mood at 2 with 10 being the worse and shared listening to others was most helpful today.    Symptoms include:    Easily distracted     Denied SI.  Shared appropriate goals. Plans to return to [facility] Friday, April 14.

## 2023-04-14 ENCOUNTER — OFFICE VISIT (OUTPATIENT)
Dept: PSYCHIATRY | Facility: HOSPITAL | Age: 18
End: 2023-04-14
Payer: COMMERCIAL

## 2023-04-14 DIAGNOSIS — F33.1 MAJOR DEPRESSIVE DISORDER, RECURRENT EPISODE, MODERATE: Primary | ICD-10-CM

## 2023-04-14 PROCEDURE — S9480 INTENSIVE OUTPATIENT PSYCHIA: HCPCS | Performed by: SOCIAL WORKER

## 2023-04-14 NOTE — PROGRESS NOTES
" Psych IOP  Group note  Observations:    Engaged in Activity / Process and Self -disclosed: Yes  Applies Topic to self: Yes  Able to give Constructive Feedback: Yes  Affect: affect consistent with mood   Degree of Insightful Thinking 6  Notes:  Offered pt time to share in group today and pt reported that his girlfriend told him she attempted suicide last night by taking several ibuprofen. Pt reported she just passed out and was okay when she woke up and told him. Therapist inquired if she went to the hospital, told her parents, called her therapist, etc., and pt reported that he did not think so. Pt processed feeling sad that she had done that but could not control her or make her do anything. Pt received support from group and therapist who inquired about how he was feeling about this and potential impact on his own depression and history of SI. Pt shared that he was maintaining belief that he needs to just continue to move forward, as previous attempts have not worked. Group supported this and reinforced that he had value and importance in the world. Pt able to hear their feedback but acknowledged that it is hard to take it in. Pt reflective on drawing and reported he \"is\" his depression, but there was no dialogue. Therapist explored this further as pt has previously shared that negative self-talk is such a big part of his depression. Pt acknowledged this and said that sometimes he is able to separate himself. Therapist and group offered support. Therapist also noted resources that could be helpful for both pt and his girlfriend.       DEANNA SpearW  "

## 2023-04-14 NOTE — PROGRESS NOTES
Other     Information/activity     2836-5207 Art Therapy - Used markers on paper to create an image of a creature from scribbles. The creature was to symbolize Depression; Then wrote a dialogue with externalized Depression; Then processed with the group.    Instructor Christopher Quiles LPAT     11:24 EDT     Patient Response Good participation  Pt focused on the task, related to peers and accepted peer support. Pt shared resistance to expression of emotion shared not being able to see self as separate from the Depression.

## 2023-04-14 NOTE — PROGRESS NOTES
"Mood at 3 with 10 being the worse and found hearing other people's thoughts to be most helpful today.     Easily distracted     No SI and goals to play guitar and do homework. Pt asked about potential d/c next Wednesday as he thought he had been in programming three weeks. Therapist noted that pt has had 9 days in treatment so far, and had more days approved by insurance if he would like to continue. Therapist explored pt's statement of \"not getting anything out of this\" and support he has received from peers. Pt reported that while he is appreciative of their support, he is not going to lie and say that anything has dramatically changed, although he found information on empathy/sympathy to be helpful. As pt reported having a therapy appt today, therapist encouraged pt to talk with therapist about d/c date and for pt to consider if he could get something from this if he stayed or would like to set up plan for discharge next week.   "

## 2023-04-17 ENCOUNTER — OFFICE VISIT (OUTPATIENT)
Dept: PSYCHIATRY | Facility: HOSPITAL | Age: 18
End: 2023-04-17
Payer: COMMERCIAL

## 2023-04-17 DIAGNOSIS — F33.1 MAJOR DEPRESSIVE DISORDER, RECURRENT EPISODE, MODERATE: Primary | ICD-10-CM

## 2023-04-17 PROCEDURE — S9480 INTENSIVE OUTPATIENT PSYCHIA: HCPCS | Performed by: COUNSELOR

## 2023-04-17 NOTE — PROGRESS NOTES
Psych IOP  Group note  Observations:    Engaged in Activity / Process and Self -disclosed: Yes  Applies Topic to self: Yes  Able to give Constructive Feedback: No  Affect: bright  Degree of Insightful Thinking 6  Notes:  Pt shared of a good weekend with his girlfriend and playing the guitar.  He continues to persevere with accomplishing his school work as he is approaching the end of the semester soon.  He was attentive and gave good feedback to several peers today.  Planning d/c on Wed.      Kendra Angeles LPCC

## 2023-04-17 NOTE — PROGRESS NOTES
Other     Information/activity     8231-5159  Watched the videos related to the handout BRAVING - The Anatomy of Trust by Wilber Delarosa; discussed aspects of trust and then discussed emotions using the handout of Feeling Worksheet    Instructor Christopher Quiles, LPAT     10:20 EDT     Patient Response Good participation  Pt focused on the topics, discussed the breakdown of the elements of trust and shared of own emotions. Pt stated the class was interesting.

## 2023-04-18 ENCOUNTER — OFFICE VISIT (OUTPATIENT)
Dept: PSYCHIATRY | Facility: HOSPITAL | Age: 18
End: 2023-04-18
Payer: COMMERCIAL

## 2023-04-18 DIAGNOSIS — F33.1 MAJOR DEPRESSIVE DISORDER, RECURRENT EPISODE, MODERATE: Primary | ICD-10-CM

## 2023-04-18 PROCEDURE — S9480 INTENSIVE OUTPATIENT PSYCHIA: HCPCS | Performed by: COUNSELOR

## 2023-04-18 NOTE — PROGRESS NOTES
Psych IOP  Group note  Observations:    Engaged in Activity / Process and Self -disclosed: No  Applies Topic to self: Yes  Able to give Constructive Feedback: Yes  Affect: bright  Degree of Insightful Thinking 7  Notes:  Pt did not need to claim time in group today.  He states the same goals of doing school work and playing the guitar.  He was, however, attentive and gave support and good feedback to his peers.      Kendra Angeles, [facility]

## 2023-04-18 NOTE — PROGRESS NOTES
[id number] Psych IOP Class     Class topic: perception/resilience     Class participation: pt shared thoughts on what may influence perception and showed insight at start of class discussion; pt listened quietly to video and did not have anything additional to share after in discussion.

## 2023-04-18 NOTE — PROGRESS NOTES
Mood at 3  with 10 being the worse and found hearing everyone's imput as most helpful    Easily distracted     No SI    Has appropriate goals for later today and to return tomorrow.

## 2023-04-19 ENCOUNTER — OFFICE VISIT (OUTPATIENT)
Dept: PSYCHIATRY | Facility: HOSPITAL | Age: 18
End: 2023-04-19
Payer: COMMERCIAL

## 2023-04-19 DIAGNOSIS — F33.1 MAJOR DEPRESSIVE DISORDER, RECURRENT EPISODE, MODERATE: Primary | ICD-10-CM

## 2023-04-19 PROCEDURE — 90853 GROUP PSYCHOTHERAPY: CPT | Performed by: COUNSELOR

## 2023-04-19 NOTE — PROGRESS NOTES
Psych IOP  Group note  Observations:    Engaged in Activity / Process and Self -disclosed: Yes  Applies Topic to self: Yes,somewhat  Able to give Constructive Feedback: Yes  Affect: somewhat brighter  Degree of Insightful Thinking 7  Notes:  Pt shared he will be discharging today.  He was very honest stating he was only here because his parents thought this might be helpful.  He stated although he has enjoyed getting to know everyone, he is not sure this has been helpful.  He also said he didn't feel his individual therapy was helping either. Pt was attentive, interacted with a peer's emotional support animal and gave appropriate feedback to his peers at times.        Kendra Angeles, [facility]

## 2023-04-19 NOTE — PROGRESS NOTES
Other     Information/activity     1760-7740 Art Therapy - Had the therapeutic story The Stream That Grew read aloud, used watercolors on paper to create an image of the story that stood out or that related to and then processed the experience with the group.    Instructor Christopher Quiles, [facility]     11:19 EDT     Patient Response Good participation  Pt focused on the task and shared the part of the story before any changes and others offering encouragement. Pt related to peers.

## 2023-04-19 NOTE — PROGRESS NOTES
Discharge Summary      Discharge Summary    Blake attended 12 IOP Sessions         Registration Date  3/30/23  Discharge Date  4/19/2023       Summary of Treatment and Progress towards goals:  Pt is now reporting an improved mood and a decrease in most of his depressive symptoms. No current SI  He states d/t his ADD, he continues with feeling easily distracted.  He has been engaged with peers and reports he is using playing his guitar and spending time with his family and girlfriend as positive coping skills.  Pt has also reported feeling better about his ability to function with his school work in the past few weeks.  He has OP providers in place    Diagnostic Impression:   Major depressive disorder, recurrent, moderate        Current Medications:  Pristiq 100 mg daily  Vyvanse 40 mg  Daily  Desvenlafax 100 mg daily    Referrals/ Appointments :   Bernardino Man, therapist, 6/9/23 2 pm  HARIS Erickson, 7/7/23 2 pm        Kendra Angeles, [facility]

## 2023-04-20 ENCOUNTER — APPOINTMENT (OUTPATIENT)
Dept: PSYCHIATRY | Facility: HOSPITAL | Age: 18
End: 2023-04-20
Payer: COMMERCIAL

## 2023-04-21 ENCOUNTER — APPOINTMENT (OUTPATIENT)
Dept: PSYCHIATRY | Facility: HOSPITAL | Age: 18
End: 2023-04-21
Payer: COMMERCIAL

## 2023-04-24 ENCOUNTER — APPOINTMENT (OUTPATIENT)
Dept: PSYCHIATRY | Facility: HOSPITAL | Age: 18
End: 2023-04-24
Payer: COMMERCIAL

## 2023-04-25 ENCOUNTER — APPOINTMENT (OUTPATIENT)
Dept: PSYCHIATRY | Facility: HOSPITAL | Age: 18
End: 2023-04-25
Payer: COMMERCIAL

## 2023-04-26 ENCOUNTER — APPOINTMENT (OUTPATIENT)
Dept: PSYCHIATRY | Facility: HOSPITAL | Age: 18
End: 2023-04-26
Payer: COMMERCIAL

## 2023-04-27 ENCOUNTER — APPOINTMENT (OUTPATIENT)
Dept: PSYCHIATRY | Facility: HOSPITAL | Age: 18
End: 2023-04-27
Payer: COMMERCIAL

## 2023-04-28 ENCOUNTER — APPOINTMENT (OUTPATIENT)
Dept: PSYCHIATRY | Facility: HOSPITAL | Age: 18
End: 2023-04-28
Payer: COMMERCIAL